# Patient Record
Sex: MALE | Employment: OTHER | URBAN - METROPOLITAN AREA
[De-identification: names, ages, dates, MRNs, and addresses within clinical notes are randomized per-mention and may not be internally consistent; named-entity substitution may affect disease eponyms.]

---

## 2021-02-18 ENCOUNTER — OFFICE VISIT (OUTPATIENT)
Dept: SURGERY | Facility: CLINIC | Age: 61
End: 2021-02-18
Payer: COMMERCIAL

## 2021-02-18 VITALS
DIASTOLIC BLOOD PRESSURE: 82 MMHG | HEART RATE: 74 BPM | TEMPERATURE: 97 F | BODY MASS INDEX: 30.92 KG/M2 | SYSTOLIC BLOOD PRESSURE: 131 MMHG | HEIGHT: 70 IN | WEIGHT: 216 LBS

## 2021-02-18 DIAGNOSIS — K64.4 EXTERNAL HEMORRHOIDS: ICD-10-CM

## 2021-02-18 DIAGNOSIS — K64.2 PROLAPSED INTERNAL HEMORRHOIDS, GRADE 3: Primary | ICD-10-CM

## 2021-02-18 DIAGNOSIS — K92.2 INTESTINAL BLEEDING: ICD-10-CM

## 2021-02-18 PROCEDURE — 3075F SYST BP GE 130 - 139MM HG: CPT | Performed by: COLON & RECTAL SURGERY

## 2021-02-18 PROCEDURE — 99243 OFF/OP CNSLTJ NEW/EST LOW 30: CPT | Performed by: COLON & RECTAL SURGERY

## 2021-02-18 PROCEDURE — 3079F DIAST BP 80-89 MM HG: CPT | Performed by: COLON & RECTAL SURGERY

## 2021-02-18 PROCEDURE — 46600 DIAGNOSTIC ANOSCOPY SPX: CPT | Performed by: COLON & RECTAL SURGERY

## 2021-02-18 PROCEDURE — 3008F BODY MASS INDEX DOCD: CPT | Performed by: COLON & RECTAL SURGERY

## 2021-02-18 NOTE — PATIENT INSTRUCTIONS
The patient presents today and consultation of Dr. Christal Werner for intestinal bleeding and hemorrhoids. The patient states that for about a year and a half now he has been having issues with bleeding with his bowel movements.   He states that all began after will be scheduling the patient undergo for rubber band treatments at least 2 weeks apart from each other. I will next see the patient at the time of his first rubber band treatment.     All risks, benefits, complications and alternatives to the proposed

## 2021-02-18 NOTE — H&P
New Patient Visit Note       Active Problems      1. Prolapsed internal hemorrhoids, grade 3    2. Intestinal bleeding    3. External hemorrhoids        Chief Complaint   Patient presents with:  Rectal Bleeding: pt c/o of rectal bleeding with bm x 1.5 yrs. and developed an assessment and plan. The physician performed all medical decision making. Carmela Duvall PA-C    I agree with the note as scribed by the PA  I performed my own physical exam and obtained the history as detailed above.   I have made al the AM with food, Disp: 12 tablet, Rfl: 0  •  Sertraline HCl 50 MG Oral Tab, Take 50 mg by mouth once daily. , Disp: , Rfl: 0      Review of Systems  The Review of Systems has been reviewed by me during today.   Review of Systems   Constitutional: Negative f distension, no fluid wave, no ascites, no pulsatile midline mass and no mass. There is no splenomegaly or hepatomegaly. There is no abdominal tenderness.  There is no rigidity, no rebound, no guarding, no CVA tenderness, no tenderness at McBurney's point an has been having issues with bleeding with his bowel movements. He states that all began after he ate at a [facility] [employer]. After that about a year and a half ago he had diarrhea for many days in a row.   This caused the hemorrhoids to flare and began b rubber band treatment. All risks, benefits, complications and alternatives to the proposed procedure(s) were fully discussed with the patient. All questions from the patient were answered in detail.  A description of the procedure(s) possible outcomes wa

## 2021-02-18 NOTE — PROCEDURES
Procedure:  Anoscopy    Surgeon: Ora Cowden    Anesthesia: None    Findings: See the progress note attached for all findings    Operative Summary: The patient was placed in a prone position on the proctoscopy table, the hips were flexed in the jackknife p

## 2021-03-04 ENCOUNTER — OFFICE VISIT (OUTPATIENT)
Dept: SURGERY | Facility: CLINIC | Age: 61
End: 2021-03-04
Payer: COMMERCIAL

## 2021-03-04 VITALS
TEMPERATURE: 97 F | BODY MASS INDEX: 30.92 KG/M2 | HEIGHT: 70 IN | DIASTOLIC BLOOD PRESSURE: 74 MMHG | SYSTOLIC BLOOD PRESSURE: 115 MMHG | WEIGHT: 216 LBS | HEART RATE: 77 BPM

## 2021-03-04 DIAGNOSIS — K64.4 EXTERNAL HEMORRHOIDS: ICD-10-CM

## 2021-03-04 DIAGNOSIS — K92.2 INTESTINAL BLEEDING: ICD-10-CM

## 2021-03-04 DIAGNOSIS — K64.2 PROLAPSED INTERNAL HEMORRHOIDS, GRADE 3: Primary | ICD-10-CM

## 2021-03-04 PROCEDURE — 3008F BODY MASS INDEX DOCD: CPT | Performed by: COLON & RECTAL SURGERY

## 2021-03-04 PROCEDURE — 3074F SYST BP LT 130 MM HG: CPT | Performed by: COLON & RECTAL SURGERY

## 2021-03-04 PROCEDURE — 46221 LIGATION OF HEMORRHOID(S): CPT | Performed by: COLON & RECTAL SURGERY

## 2021-03-04 PROCEDURE — 3078F DIAST BP <80 MM HG: CPT | Performed by: COLON & RECTAL SURGERY

## 2021-03-04 NOTE — PROGRESS NOTES
Follow Up Visit Note       Active Problems      1. Prolapsed internal hemorrhoids, grade 3    2. External hemorrhoids    3. Intestinal bleeding          Chief Complaint   Patient presents with:  Hemorrhoid Banding: EST PT 1st banding.          History of Pr distention, abdominal pain, anal bleeding, blood in stool, constipation, diarrhea, nausea and vomiting. Genitourinary: Negative for difficulty urinating, dysuria, frequency and urgency. Musculoskeletal: Negative for arthralgias and myalgias.    Skin: Ne

## 2021-03-04 NOTE — PATIENT INSTRUCTIONS
At today's office visit we treated right duglas-lateral internal hemorrhoid. At today's visit, the patient underwent an uncomplicated application of a rubber band and injection of a 5% phenol solution into the base of the rubberbanded hemorrhoid.     The

## 2021-03-04 NOTE — PROCEDURES
Pre op diagnosis: Internal Hemorrhoids    Post op diagnosis: Same    Procedure: Anoscopy with O-ring Rubber Band Ligation of Internal Hemorrhoids    Surgeon: Urmila Stewart MD    History of present illness:    This patient has internal hemorrhoids that are s

## 2021-03-18 ENCOUNTER — OFFICE VISIT (OUTPATIENT)
Dept: SURGERY | Facility: CLINIC | Age: 61
End: 2021-03-18

## 2021-03-18 VITALS
WEIGHT: 216 LBS | DIASTOLIC BLOOD PRESSURE: 74 MMHG | BODY MASS INDEX: 30.92 KG/M2 | SYSTOLIC BLOOD PRESSURE: 123 MMHG | HEIGHT: 70 IN | HEART RATE: 76 BPM | TEMPERATURE: 98 F

## 2021-03-18 DIAGNOSIS — K64.2 PROLAPSED INTERNAL HEMORRHOIDS, GRADE 3: Primary | ICD-10-CM

## 2021-03-18 PROCEDURE — 3008F BODY MASS INDEX DOCD: CPT | Performed by: COLON & RECTAL SURGERY

## 2021-03-18 PROCEDURE — 3074F SYST BP LT 130 MM HG: CPT | Performed by: COLON & RECTAL SURGERY

## 2021-03-18 PROCEDURE — 46221 LIGATION OF HEMORRHOID(S): CPT | Performed by: COLON & RECTAL SURGERY

## 2021-03-18 PROCEDURE — 3078F DIAST BP <80 MM HG: CPT | Performed by: COLON & RECTAL SURGERY

## 2021-03-18 RX ORDER — FERROUS SULFATE 325(65) MG
325 TABLET ORAL 2 TIMES DAILY
COMMUNITY
Start: 2021-01-21

## 2021-03-18 RX ORDER — ESOMEPRAZOLE MAGNESIUM 40 MG/1
CAPSULE, DELAYED RELEASE ORAL
COMMUNITY
Start: 2020-03-27

## 2021-03-18 RX ORDER — HYDROCODONE BITARTRATE AND ACETAMINOPHEN 10; 325 MG/1; MG/1
1 TABLET ORAL
COMMUNITY
Start: 2021-01-19

## 2021-03-18 NOTE — PROGRESS NOTES
Follow Up Visit Note       Active Problems      1. Prolapsed internal hemorrhoids, grade 3          Chief Complaint   Patient presents with:  Hemorrhoid Bandinnd hemorrhoid banding. PT HAD DISCOMFORT UNTIL YESTERDAY. c/o minimal rectal bleeding. Violence:       Fear of Current or Ex-Partner:       Emotionally Abused:       Physically Abused:       Sexually Abused:      Current Outpatient Medications:   •  Esomeprazole Magnesium 40 MG Oral Capsule Delayed Release, TAKE 1 CAPSULE BY MOUTH EVERY MORN patient underwent an uncomplicated application of a rubber band and injection of a 5% phenol solution into the base of the rubberbanded hemorrhoid. The patient tolerated the procedure well.   The patient remained stable throughout the entire procedure wi

## 2021-03-18 NOTE — PROCEDURES
Pre op diagnosis: Internal Hemorrhoids    Post op diagnosis: Same    Procedure: Anoscopy with O-ring Rubber Band Ligation of Internal Hemorrhoids    Surgeon: Alie Pérez MD    History of present illness:    This patient has internal hemorrhoids that are s

## 2021-04-01 ENCOUNTER — OFFICE VISIT (OUTPATIENT)
Dept: SURGERY | Facility: CLINIC | Age: 61
End: 2021-04-01
Payer: COMMERCIAL

## 2021-04-01 VITALS
HEART RATE: 75 BPM | HEIGHT: 70 IN | TEMPERATURE: 98 F | WEIGHT: 215 LBS | BODY MASS INDEX: 30.78 KG/M2 | DIASTOLIC BLOOD PRESSURE: 75 MMHG | SYSTOLIC BLOOD PRESSURE: 122 MMHG

## 2021-04-01 DIAGNOSIS — K64.2 PROLAPSED INTERNAL HEMORRHOIDS, GRADE 3: Primary | ICD-10-CM

## 2021-04-01 PROCEDURE — 3074F SYST BP LT 130 MM HG: CPT | Performed by: COLON & RECTAL SURGERY

## 2021-04-01 PROCEDURE — 3008F BODY MASS INDEX DOCD: CPT | Performed by: COLON & RECTAL SURGERY

## 2021-04-01 PROCEDURE — 3078F DIAST BP <80 MM HG: CPT | Performed by: COLON & RECTAL SURGERY

## 2021-04-01 PROCEDURE — 46221 LIGATION OF HEMORRHOID(S): CPT | Performed by: COLON & RECTAL SURGERY

## 2021-04-01 NOTE — PROCEDURES
Pre op diagnosis: Internal Hemorrhoids    Post op diagnosis: Same    Procedure: Anoscopy with O-ring Rubber Band Ligation of Internal Hemorrhoids    Surgeon: Shamar Klein MD    History of present illness:    This patient has internal hemorrhoids that are s

## 2021-04-01 NOTE — PATIENT INSTRUCTIONS
At today's office visit we treated a right postero-lateral internal hemorrhoid. At today's visit, the patient underwent an uncomplicated application of a rubber band and injection of a 5% phenol solution into the base of the rubberbanded hemorrhoid.

## 2021-04-01 NOTE — PROGRESS NOTES
Follow Up Visit Note       Active Problems      1. Prolapsed internal hemorrhoids, grade 3          Chief Complaint   Patient presents with:  Hemorrhoid Banding: EP 3RD BANDING-  PT denies rectal pain or bleeding. PT states has normal Bm's.          History Fear of Current or Ex-Partner:       Emotionally Abused:       Physically Abused:       Sexually Abused:      Current Outpatient Medications:   •  Esomeprazole Magnesium 40 MG Oral Capsule Delayed Release, TAKE 1 CAPSULE BY MOUTH EVERY MORNING BEFORE BREAK injection of a 5% phenol solution into the base of the rubberbanded hemorrhoid. The patient tolerated the procedure well. The patient remained stable throughout the entire procedure within my office.     The patient was asked to recover in my office for

## 2021-05-13 ENCOUNTER — OFFICE VISIT (OUTPATIENT)
Dept: SURGERY | Facility: CLINIC | Age: 61
End: 2021-05-13
Payer: COMMERCIAL

## 2021-05-13 VITALS — TEMPERATURE: 98 F | DIASTOLIC BLOOD PRESSURE: 75 MMHG | HEART RATE: 73 BPM | SYSTOLIC BLOOD PRESSURE: 134 MMHG

## 2021-05-13 DIAGNOSIS — K92.2 INTESTINAL BLEEDING: ICD-10-CM

## 2021-05-13 DIAGNOSIS — K64.2 PROLAPSED INTERNAL HEMORRHOIDS, GRADE 3: Primary | ICD-10-CM

## 2021-05-13 DIAGNOSIS — K64.4 EXTERNAL HEMORRHOIDS: ICD-10-CM

## 2021-05-13 PROCEDURE — 3078F DIAST BP <80 MM HG: CPT | Performed by: COLON & RECTAL SURGERY

## 2021-05-13 PROCEDURE — 46221 LIGATION OF HEMORRHOID(S): CPT | Performed by: COLON & RECTAL SURGERY

## 2021-05-13 PROCEDURE — 3075F SYST BP GE 130 - 139MM HG: CPT | Performed by: COLON & RECTAL SURGERY

## 2021-05-13 NOTE — PROGRESS NOTES
Follow Up Visit Note       Active Problems      1. Prolapsed internal hemorrhoids, grade 3    2. External hemorrhoids    3.  Intestinal bleeding          Chief Complaint   Patient presents with:  Hemorrhoids: EST - 4th banding -- Pt denies pain or swelling HENT: Negative for hearing loss, nosebleeds, sore throat and trouble swallowing. Respiratory: Negative for apnea, cough, shortness of breath and wheezing. Cardiovascular: Negative for chest pain, palpitations and leg swelling.    Gastrointestinal: N my surgical intervention. No orders of the defined types were placed in this encounter. Imaging & Referrals   None    Follow Up  Return if symptoms worsen or fail to improve.     Army Miguel MD

## 2021-05-13 NOTE — PATIENT INSTRUCTIONS
At today's office visit we treated a left anterolateral internal hemorrhoid grade 3. The patient remains with external hemorrhoids that are unchanged from prior exams. The patient reports good results with the rubber band treatments thus far.     At today

## 2021-10-13 ENCOUNTER — TELEPHONE (OUTPATIENT)
Dept: SURGERY | Facility: CLINIC | Age: 61
End: 2021-10-13

## (undated) NOTE — LETTER
21    Patient: Lauren Henry  : [id number] Visit date: 2021    Dear  Tunde Flores    Thank you for referring Lauren Henry to my practice. Please find my assessment and plan below.        Assessment   Prolapsed internal hemorrhoids, grade 3 Clinical examination of the rectum reveals there to be no external hemorrhoids, fissures, fistula, or abscesses. Prostate is of normal shape and size. There are large grade 3 internal hemorrhoids and 3 classic locations.   There is no sign of any proctiti

## (undated) NOTE — LETTER
21    Patient: Valery Patiño  :  Visit date: 3/4/2021    Dear  Aniyah Blas    Thank you for referring Valery Kaylyn to my practice. Please find my assessment and plan below.     Assessment   Prolapsed internal hemorrhoids, grade 3  (pr

## (undated) NOTE — LETTER
21    Patient: Michael Laser  : [id number] Visit date: 2021    Dear  Lalit Hernandez    Thank you for referring Michael Paredes to my practice. Please find my assessment and plan below.     Assessment   Prolapsed internal hemorrhoids, grade 3  (madison

## (undated) NOTE — LETTER
21    Patient: Lauren Henry  :  Visit date: 3/18/2021    Dear  Sirisha Clark    Thank you for referring Lauren Henry to my practice. Please find my assessment and plan below.     Assessment   Prolapsed internal hemorrhoids, grade 3  (pr

## (undated) NOTE — LETTER
21    Patient: Deloris Stephens  :  Visit date: 2021    Dear  Dianna Deal    Thank you for referring Deloris Stephens to my practice. Please find my assessment and plan below.     Assessment   Prolapsed internal hemorrhoids, grade 3  (pr